# Patient Record
Sex: FEMALE | Race: WHITE | Employment: FULL TIME | URBAN - METROPOLITAN AREA
[De-identification: names, ages, dates, MRNs, and addresses within clinical notes are randomized per-mention and may not be internally consistent; named-entity substitution may affect disease eponyms.]

---

## 2018-03-12 ENCOUNTER — OFFICE VISIT (OUTPATIENT)
Dept: URGENT CARE | Facility: URGENT CARE | Age: 23
End: 2018-03-12
Payer: COMMERCIAL

## 2018-03-12 VITALS
OXYGEN SATURATION: 99 % | TEMPERATURE: 98.2 F | SYSTOLIC BLOOD PRESSURE: 122 MMHG | HEART RATE: 85 BPM | WEIGHT: 155 LBS | BODY MASS INDEX: 24.91 KG/M2 | DIASTOLIC BLOOD PRESSURE: 73 MMHG | HEIGHT: 66 IN

## 2018-03-12 DIAGNOSIS — J01.10 ACUTE NON-RECURRENT FRONTAL SINUSITIS: Primary | ICD-10-CM

## 2018-03-12 PROCEDURE — 99203 OFFICE O/P NEW LOW 30 MIN: CPT | Performed by: PHYSICIAN ASSISTANT

## 2018-03-12 NOTE — PROGRESS NOTES
"SUBJECTIVE:   Sheila Dangelo is a 22 year old female presenting with a chief complaint of sinus pain.     Onset of symptoms was 2 week(s) ago.  Course of illness is worsening.    Severity moderate  Current and Associated symptoms: sore throat, facial pain/pressure and headache. She has purulent nasal drainage. She has an AM cough- sometimes productive. No fever. No shortness of breath. She admits to fatigue.  Treatment measures tried include Decongestants with some improvement.  Predisposing factors include ill contact: School- .    ROS  See HPI    Past Medical History:   Diagnosis Date     Brachial cleft cyst      Hashimoto's thyroiditis      Thyroid disorder     hypo     Current Outpatient Prescriptions   Medication Sig Dispense Refill     amoxicillin-clavulanate (AUGMENTIN) 875-125 MG per tablet Take 1 tablet by mouth 2 times daily 20 tablet 0     levothyroxine (SYNTHROID, LEVOTHROID) 75 MCG tablet Take 0.5 tablets (37.5 mcg) by mouth daily 15 tablet 0       Family History   Problem Relation Age of Onset     Thyroid Disease Mother      Thyroid Disease Maternal Grandmother      Thyroid Disease Paternal Grandmother        Social History   Substance Use Topics     Smoking status: Never Smoker     Smokeless tobacco: Never Used     Alcohol use No     Smoking: none      OBJECTIVE  /73  Pulse 85  Temp 98.2  F (36.8  C)  Ht 5' 6\" (1.676 m)  Wt 155 lb (70.3 kg)  SpO2 99%  BMI 25.02 kg/m2    General: alert, appears well, NAD. Afebrile.  Skin: no suspicious lesions or rashes.  HEENT: Normocephalic.   Eyes: conjunctiva clear.   Ears: TMs pearly, translucent bilaterally.   Nose: no nasal polyps. + erythema and edema of nasal mucosa. No rhinorrhea. + frontal sinus tenderness.  Oropharynx: MMM. No posterior pharyngeal erythema, petechiae, or exudate. No tonsillar hypertrophy. Uvula midline.    Neck: supple, no lymphadenopathy.  Respiratory: No distress. Equal inspiration to bilateral bases. No crackles " wheeze, rhonchi, rales.   Cardiovascular: RRR. No murmurs, clicks, gallups, or rub.         ASSESSMENT/PLAN:    ICD-10-CM    1. Acute non-recurrent frontal sinusitis J01.10 amoxicillin-clavulanate (AUGMENTIN) 875-125 MG per tablet           Medical Decision Making:    Differential Diagnosis: viral URI, sinusitis    Serious Comorbid Conditions: none    Symptoms are suggestive of a bacterial sinusitis given persistence of sinus pressure and congestion, despite resolution of other URI symptoms.  No evidence for sinus abscess, CNS infection or mass, or any other more malicious etiology of symptoms today.  We will treat with Augmentin.  Also discussed and prescribed other supportive cares.      At the end of the encounter, I discussed all available results, as well as the diagnosis and any associated medications. I discussed red flags for immediate return to clinic/ER, as well as indications for follow up. Refer to patient instructions below, which were all addressed with patient. Patient understood and agreed to plan. Patient was appropriate for discharge.      Patient Instructions   Sinusitis  You have a sinus infection.    Take Augmentin twice daily with food x 10 days.    Take a probiotic while taking the antibiotic.    Take Mucinex twice daily. Use sinus rinses or Neti-pot, steamy showers, humidifiers.    Try Flonase 2 sprays in each nostril nightly at bedtime.    Take ibuprofen or Tylenol for sinus pain.    Follow up with primary care provider if no improvement in 2 weeks, sooner if worsening or developing any new symptoms.                  Sharmin Segura PA-C

## 2018-03-12 NOTE — NURSING NOTE
"Chief Complaint   Patient presents with     Facial Pain     pt in clinic c/o facial pain, congestion, ,and sore throat.      Urgent Care       Initial /73  Pulse 85  Temp 98.2  F (36.8  C)  Ht 5' 6\" (1.676 m)  Wt 155 lb (70.3 kg)  SpO2 99%  BMI 25.02 kg/m2 Estimated body mass index is 25.02 kg/(m^2) as calculated from the following:    Height as of this encounter: 5' 6\" (1.676 m).    Weight as of this encounter: 155 lb (70.3 kg).  Medication Reconciliation: complete   Bambi White/ MA    "

## 2018-03-12 NOTE — MR AVS SNAPSHOT
After Visit Summary   3/12/2018    Sheila Dangelo    MRN: [id number]           Patient Information     Date Of Birth          1995        Visit Information        Provider Department      3/12/2018 5:00 PM Sharmin Segura PA-C [facility]        Today's Diagnoses     Acute non-recurrent frontal sinusitis    -  1      Care Instructions    Sinusitis  You have a sinus infection.    Take Augmentin twice daily with food x 10 days.    Take a probiotic while taking the antibiotic.    Take Mucinex twice daily. Use sinus rinses or Neti-pot, steamy showers, humidifiers.    Try Flonase 2 sprays in each nostril nightly at bedtime.    Take ibuprofen or Tylenol for sinus pain.    Follow up with primary care provider if no improvement in 2 weeks, sooner if worsening or developing any new symptoms.            Follow-ups after your visit        Follow-up notes from your care team     Return if symptoms worsen or fail to improve.      Who to contact     If you have questions or need follow up information about today's clinic visit or your schedule please contact [facility] directly at [phone].  Normal or non-critical lab and imaging results will be communicated to you by TrialScopehart, letter or phone within 4 business days after the clinic has received the results. If you do not hear from us within 7 days, please contact the clinic through B2M Solutionst or phone. If you have a critical or abnormal lab result, we will notify you by phone as soon as possible.  Submit refill requests through FSLogix or call your pharmacy and they will forward the refill request to us. Please allow 3 business days for your refill to be completed.          Additional Information About Your Visit        TrialScopehart Information     FSLogix lets you send messages to your doctor, view your test results, renew your prescriptions, schedule appointments and more. To sign up, go to  [email or web address] . Click on \"Log in\" on the left side of the screen, which will take you to the Welcome page. Then click on \"Sign up Now\" on the right side of the page.     You will be asked to enter the access code listed below, as well as some personal information. Please follow the directions to create your username and password.     Your access code is: [email or web address]  Expires: 6/10/2018  5:06 PM     Your access code will  in 90 days. If you need help or a new code, please call your [place] clinic or [phone].        Care EveryWhere ID     This is your Care EveryWhere ID. This could be used by other organizations to access your [place] medical records  [id number]         Blood Pressure from Last 3 Encounters:   01/31/15 114/70   14 97/50   14 117/77    Weight from Last 3 Encounters:   01/31/15 152 lb (68.9 kg) (84 %)*   14 157 lb (71.2 kg) (88 %)*   14 152 lb 8 oz (69.2 kg) (86 %)*     * Growth percentiles are based on [facility] 2-20 Years data.              Today, you had the following     No orders found for display         Today's Medication Changes          These changes are accurate as of 3/12/18  5:06 PM.  If you have any questions, ask your nurse or doctor.               Start taking these medicines.        Dose/Directions    amoxicillin-clavulanate 875-125 MG per tablet   Commonly known as:  AUGMENTIN   Used for:  Acute non-recurrent frontal sinusitis        Dose:  1 tablet   Take 1 tablet by mouth 2 times daily   Quantity:  20 tablet   Refills:  0            Where to get your medicines      These medications were sent to [id number] - [place], [place] - [address] AT [facility] [address], [place] [address]    Hours:  24-hours Phone:  [phone]     amoxicillin-clavulanate 875-125 MG per tablet                Primary Care Provider Office Phone # Fax #    Cj Lacey [phone]       [facility] [address] [address]        Equal Access to Services     LUISAANAYA ARDEN : Hadii urbano pederson Soricky, waaxda lucarolinaadaha, qaybta kaallyda edy, aixa mccormackalinemarleen dennison. So [facility] [phone].    ATENCIÓN: Si habla español, tiene a pace disposición servicios gratuitos de asistencia lingüística. Kgame al [phone].    We comply with applicable federal civil rights laws and [place] laws. We do not discriminate on the basis of race, color, national origin, age, disability, sex, sexual orientation, or gender identity.            Thank you!     Thank you for choosing [facility]  for your care. Our goal is always to provide you with excellent care. Hearing back from our patients is one way we can continue to improve our services. Please take a few minutes to complete the written survey that you may receive in the mail after your visit with us. Thank you!             Your Updated Medication List - Protect others around you: Learn how to safely use, store and throw away your medicines at www.disposemymeds.org.          This list is accurate as of 3/12/18  5:06 PM.  Always use your most recent med list.                   Brand Name Dispense Instructions for use Diagnosis    amoxicillin-clavulanate 875-125 MG per tablet    AUGMENTIN    20 tablet    Take 1 tablet by mouth 2 times daily    Acute non-recurrent frontal sinusitis       levothyroxine 75 MCG tablet    SYNTHROID/LEVOTHROID    15 tablet    Take 0.5 tablets (37.5 mcg) by mouth daily    Goiter

## 2018-03-12 NOTE — PATIENT INSTRUCTIONS
Sinusitis  You have a sinus infection.    Take Augmentin twice daily with food x 10 days.    Take a probiotic while taking the antibiotic.    Take Mucinex twice daily. Use sinus rinses or Neti-pot, steamy showers, humidifiers.    Try Flonase 2 sprays in each nostril nightly at bedtime.    Take ibuprofen or Tylenol for sinus pain.    Follow up with primary care provider if no improvement in 2 weeks, sooner if worsening or developing any new symptoms.

## 2018-04-23 ENCOUNTER — OFFICE VISIT (OUTPATIENT)
Dept: URGENT CARE | Facility: URGENT CARE | Age: 23
End: 2018-04-23
Payer: COMMERCIAL

## 2018-04-23 VITALS
SYSTOLIC BLOOD PRESSURE: 90 MMHG | BODY MASS INDEX: 23.3 KG/M2 | RESPIRATION RATE: 14 BRPM | TEMPERATURE: 97.8 F | DIASTOLIC BLOOD PRESSURE: 60 MMHG | HEIGHT: 66 IN | HEART RATE: 90 BPM | WEIGHT: 145 LBS

## 2018-04-23 DIAGNOSIS — H60.502 ACUTE NONINFECTIVE OTITIS EXTERNA OF LEFT EAR, UNSPECIFIED TYPE: Primary | ICD-10-CM

## 2018-04-23 PROCEDURE — 99214 OFFICE O/P EST MOD 30 MIN: CPT | Performed by: FAMILY MEDICINE

## 2018-04-23 RX ORDER — NEOMYCIN SULFATE, POLYMYXIN B SULFATE AND HYDROCORTISONE 10; 3.5; 1 MG/ML; MG/ML; [USP'U]/ML
4 SUSPENSION/ DROPS AURICULAR (OTIC) 3 TIMES DAILY
Qty: 6 ML | Refills: 0 | Status: SHIPPED | OUTPATIENT
Start: 2018-04-23 | End: 2018-05-03

## 2018-04-23 NOTE — MR AVS SNAPSHOT
After Visit Summary   4/23/2018    Sheila Dangelo    MRN: [id number]           Patient Information     Date Of Birth          1995        Visit Information        Provider Department      4/23/2018 6:35 PM Ramonita Iverson MD [facility]        Today's Diagnoses     Acute noninfective otitis externa of left ear, unspecified type    -  1      Care Instructions      External Ear Infection (Adult)    External otitis (also called  swimmer s ear ) is an infection in the ear canal. It is often caused by bacteria or fungus. It can occur a few days after water gets trapped in the ear canal (from swimming or bathing). It can also occur after cleaning too deeply in the ear canal with a cotton swab or other object. Sometimes, hair care products get into the ear canal and cause this problem.  Symptoms can include pain, fever, itching, redness, drainage, or swelling of the ear canal. Temporary hearing loss may also occur.  Home care    Do not try to clean the ear canal. This can push pus and bacteria deeper into the canal.    Use prescribed ear drops as directed. These help reduce swelling and fight the infection. If an ear wick was placed in the ear canal, apply drops right onto the end of the wick. The wick will draw the medicine into the ear canal even if it is swollen closed.    A cotton ball may be loosely placed in the outer ear to absorb any drainage.    You may use acetaminophen or ibuprofen to control pain, unless another medicine was prescribed. Note: If you have chronic liver or kidney disease or ever had a stomach ulcer or GI bleeding, talk to your healthcare provider before taking any of these medicines.    Do not allow water to get into your ear when bathing. Also, don't swim until the infection has cleared.  Prevention    Keep your ears dry. This helps lower the risk of infection. Dry your ears with a towel or hair dryer after getting wet. Also, use ear plugs when  swimming.    Do not stick any objects in the ear to remove wax.    If you feel water trapped in your ear, use ear drops right away. You can get these drops over the counter at most drugstores. They work by removing water from the ear canal.  Follow-up care  Follow up with your healthcare provider in 1 week, or as advised.  When to seek medical advice  Call your healthcare provider right away if any of these occur:    Ear pain becomes worse or doesn t improve after 3 days of treatment    Redness or swelling of the outer ear occurs or gets worse    Headache    Painful or stiff neck    Drowsiness or confusion    Fever of 100.4 F (38 C) or higher, or as directed by your healthcare provider    Seizure  Date Last Reviewed: 10/1/2017    8175-5774 [employer]. [address] [place] [address]. All rights reserved. This information is not intended as a substitute for professional medical care. Always follow your healthcare professional's instructions.                Follow-ups after your visit        Follow-up notes from your care team     Return if symptoms worsen or fail to improve.      Who to contact     If you have questions or need follow up information about today's clinic visit or your schedule please contact [facility] directly at [phone].  Normal or non-critical lab and imaging results will be communicated to you by Videologyhart, letter or phone within 4 business days after the clinic has received the results. If you do not hear from us within 7 days, please contact the clinic through Videologyhart or phone. If you have a critical or abnormal lab result, we will notify you by phone as soon as possible.  Submit refill requests through TV Volume Wizard App or call your pharmacy and they will forward the refill request to us. Please allow 3 business days for your refill to be completed.          Additional Information About Your Visit        TV Volume Wizard App Information     TV Volume Wizard App lets you send  "messages to your doctor, view your test results, renew your prescriptions, schedule appointments and more. To sign up, go to [email or web address] . Click on \"Log in\" on the left side of the screen, which will take you to the Welcome page. Then click on \"Sign up Now\" on the right side of the page.     You will be asked to enter the access code listed below, as well as some personal information. Please follow the directions to create your username and password.     Your access code is: [email or web address]  Expires: 6/10/2018  5:06 PM     Your access code will  in 90 days. If you need help or a new code, please call your [place] clinic or [phone].        Care EveryWhere ID     This is your Care EveryWhere ID. This could be used by other organizations to access your [place] medical records  [id number]        Your Vitals Were     Pulse Temperature Respirations Height Last Period Breastfeeding?    90 97.8  F (36.6  C) (Oral) 14 5' 6\" (1.676 m) 2018 Unknown    BMI (Body Mass Index)                   23.4 kg/m2            Blood Pressure from Last 3 Encounters:   18 90/60   18 122/73   01/31/15 114/70    Weight from Last 3 Encounters:   18 145 lb (65.8 kg)   18 155 lb (70.3 kg)   01/31/15 152 lb (68.9 kg) (84 %)*     * Growth percentiles are based on CDC 2-20 Years data.              Today, you had the following     No orders found for display         Today's Medication Changes          These changes are accurate as of 18  7:15 PM.  If you have any questions, ask your nurse or doctor.               Start taking these medicines.        Dose/Directions    neomycin-polymyxin-hydrocortisone 3.5-39540-8 otic suspension   Commonly known as:  CORTISPORIN   Used for:  Acute noninfective otitis externa of left ear, unspecified type   Started by:  Ramonita Iverson MD        Dose:  4 drop   Place 4 drops Into the left ear 3 times daily for 10 days   Quantity:  6 mL   Refills:  " 0         Stop taking these medicines if you haven't already. Please contact your care team if you have questions.     amoxicillin-clavulanate 875-125 MG per tablet   Commonly known as:  AUGMENTIN   Stopped by:  Ramonita Iverson MD                Where to get your medicines      These medications were sent to [id number] - [place], [place] - [address] AT [facility] [address]  [address], [place] [address]    Hours:  24-hours Phone:  [phone]     neomycin-polymyxin-hydrocortisone 3.5-63367-8 otic suspension                Primary Care Provider Office Phone # Fax #    Cj Lacey [phone]       [facility] [address]  [facility] [address]        Equal Access to Services     ANAYA [facility] HospitalPHILLIP : Hadii aad ku hadasho Soomaali, waaxda luqadaha, qaybta kaalmada adeegyada, aixa juares hayaan moni jean . So [facility] [phone].    ATENCIÓN: Si habla español, tiene a pace disposición servicios gratuitos de asistencia lingüística. Evi al [phone].    We comply with applicable federal civil rights laws and [place] laws. We do not discriminate on the basis of race, color, national origin, age, disability, sex, sexual orientation, or gender identity.            Thank you!     Thank you for choosing [facility]  for your care. Our goal is always to provide you with excellent care. Hearing back from our patients is one way we can continue to improve our services. Please take a few minutes to complete the written survey that you may receive in the mail after your visit with us. Thank you!             Your Updated Medication List - Protect others around you: Learn how to safely use, store and throw away your medicines at www.disposemymeds.org.          This list is accurate as of 4/23/18  7:15 PM.  Always use your most recent med list.                   Brand Name Dispense Instructions for use  Diagnosis    levothyroxine 75 MCG tablet    SYNTHROID/LEVOTHROID    15 tablet    Take 0.5 tablets (37.5 mcg) by mouth daily    Goiter       neomycin-polymyxin-hydrocortisone 3.5-82250-1 otic suspension    CORTISPORIN    6 mL    Place 4 drops Into the left ear 3 times daily for 10 days    Acute noninfective otitis externa of left ear, unspecified type

## 2018-04-24 NOTE — PATIENT INSTRUCTIONS
External Ear Infection (Adult)    External otitis (also called  swimmer s ear ) is an infection in the ear canal. It is often caused by bacteria or fungus. It can occur a few days after water gets trapped in the ear canal (from swimming or bathing). It can also occur after cleaning too deeply in the ear canal with a cotton swab or other object. Sometimes, hair care products get into the ear canal and cause this problem.  Symptoms can include pain, fever, itching, redness, drainage, or swelling of the ear canal. Temporary hearing loss may also occur.  Home care    Do not try to clean the ear canal. This can push pus and bacteria deeper into the canal.    Use prescribed ear drops as directed. These help reduce swelling and fight the infection. If an ear wick was placed in the ear canal, apply drops right onto the end of the wick. The wick will draw the medicine into the ear canal even if it is swollen closed.    A cotton ball may be loosely placed in the outer ear to absorb any drainage.    You may use acetaminophen or ibuprofen to control pain, unless another medicine was prescribed. Note: If you have chronic liver or kidney disease or ever had a stomach ulcer or GI bleeding, talk to your healthcare provider before taking any of these medicines.    Do not allow water to get into your ear when bathing. Also, don't swim until the infection has cleared.  Prevention    Keep your ears dry. This helps lower the risk of infection. Dry your ears with a towel or hair dryer after getting wet. Also, use ear plugs when swimming.    Do not stick any objects in the ear to remove wax.    If you feel water trapped in your ear, use ear drops right away. You can get these drops over the counter at most drugstores. They work by removing water from the ear canal.  Follow-up care  Follow up with your healthcare provider in 1 week, or as advised.  When to seek medical advice  Call your healthcare provider right away if any of these  occur:    Ear pain becomes worse or doesn t improve after 3 days of treatment    Redness or swelling of the outer ear occurs or gets worse    Headache    Painful or stiff neck    Drowsiness or confusion    Fever of 100.4 F (38 C) or higher, or as directed by your healthcare provider    Seizure  Date Last Reviewed: 10/1/2017    4219-8373 [employer]. [address] [place] [address]. All rights reserved. This information is not intended as a substitute for professional medical care. Always follow your healthcare professional's instructions.

## 2018-04-24 NOTE — PROGRESS NOTES
"(S) 23 year old female complains of pain in left ear for 1 week.  She noticed some blood in the left ear canal.  It feels sore and plugged up.  No fever or URI symptoms. Has not been swimming.  She has a h/o left ruptured TM last fall.     (O) BP 90/60  Pulse 90  Temp 97.8  F (36.6  C) (Oral)  Resp 14  Ht 5' 6\" (1.676 m)  Wt 145 lb (65.8 kg)  LMP 04/23/2018  Breastfeeding? Unknown  BMI 23.4 kg/m2    Left ear reveals tenderness of the tragus; old blood scab at inferior aspect of left external canal. TM is normal.  Right ear canal and TM are normal.     (A) Left Otitis Externa    (P)  Suspect early infection from previous ear canal trauma.  RTC if not improving as anticipated.     Ramonita Kimble MD   "

## 2018-07-11 ENCOUNTER — RECORDS - HEALTHEAST (OUTPATIENT)
Dept: LAB | Facility: CLINIC | Age: 23
End: 2018-07-11

## 2018-07-11 LAB
ALBUMIN UR-MCNC: ABNORMAL MG/DL
APPEARANCE UR: ABNORMAL
BACTERIA #/AREA URNS HPF: ABNORMAL HPF
BILIRUB UR QL STRIP: NEGATIVE
COLOR UR AUTO: YELLOW
GLUCOSE UR STRIP-MCNC: NEGATIVE MG/DL
HGB UR QL STRIP: ABNORMAL
KETONES UR STRIP-MCNC: NEGATIVE MG/DL
LEUKOCYTE ESTERASE UR QL STRIP: ABNORMAL
MUCOUS THREADS #/AREA URNS LPF: ABNORMAL LPF
NITRATE UR QL: NEGATIVE
PH UR STRIP: 7 [PH] (ref 4.5–8)
RBC #/AREA URNS AUTO: ABNORMAL HPF
SP GR UR STRIP: 1.02 (ref 1–1.03)
SQUAMOUS #/AREA URNS AUTO: >100 LPF
UROBILINOGEN UR STRIP-ACNC: ABNORMAL
WBC #/AREA URNS AUTO: ABNORMAL HPF
WBC CLUMPS #/AREA URNS HPF: PRESENT /[HPF]

## 2018-07-12 LAB — BACTERIA SPEC CULT: NO GROWTH

## 2019-01-26 ENCOUNTER — OFFICE VISIT (OUTPATIENT)
Dept: URGENT CARE | Facility: URGENT CARE | Age: 24
End: 2019-01-26
Payer: COMMERCIAL

## 2019-01-26 VITALS
DIASTOLIC BLOOD PRESSURE: 72 MMHG | TEMPERATURE: 98.1 F | SYSTOLIC BLOOD PRESSURE: 107 MMHG | HEART RATE: 67 BPM | BODY MASS INDEX: 23.3 KG/M2 | WEIGHT: 145 LBS | HEIGHT: 66 IN | OXYGEN SATURATION: 100 %

## 2019-01-26 DIAGNOSIS — L03.012 PARONYCHIA OF FINGER OF LEFT HAND: Primary | ICD-10-CM

## 2019-01-26 PROCEDURE — 99213 OFFICE O/P EST LOW 20 MIN: CPT | Performed by: FAMILY MEDICINE

## 2019-01-26 ASSESSMENT — MIFFLIN-ST. JEOR: SCORE: 1434.47

## 2019-01-26 NOTE — PROGRESS NOTES
Subjective: Patient has had trouble with nails in the past with biting, worse and thick nails for a while but took them off, 2 days ago she started noticing left middle finger pain and swelling got a lot worse today.  No pus coming out.    Objective: He has redness and swelling around the nail left middle finger.  I do not feel a pocket of pus at this point.    Assessment and plan: Paronychial infection, will try antibiotics but if it gets worse and will need to be opened up.  She should know by tomorrow.

## 2019-02-23 ENCOUNTER — OFFICE VISIT (OUTPATIENT)
Dept: URGENT CARE | Facility: URGENT CARE | Age: 24
End: 2019-02-23
Payer: COMMERCIAL

## 2019-02-23 VITALS
HEART RATE: 66 BPM | WEIGHT: 145 LBS | OXYGEN SATURATION: 100 % | SYSTOLIC BLOOD PRESSURE: 91 MMHG | TEMPERATURE: 97.1 F | BODY MASS INDEX: 23.4 KG/M2 | DIASTOLIC BLOOD PRESSURE: 64 MMHG

## 2019-02-23 DIAGNOSIS — L03.012 PARONYCHIA OF LEFT MIDDLE FINGER: Primary | ICD-10-CM

## 2019-02-23 PROCEDURE — 99213 OFFICE O/P EST LOW 20 MIN: CPT | Performed by: FAMILY MEDICINE

## 2019-02-23 RX ORDER — CEPHALEXIN 500 MG/1
500 CAPSULE ORAL 4 TIMES DAILY
Qty: 28 CAPSULE | Refills: 0 | Status: SHIPPED | OUTPATIENT
Start: 2019-02-23 | End: 2019-03-02

## 2019-02-23 NOTE — PATIENT INSTRUCTIONS
Start the antibiotic today.  Warm soaks or compresses.   Ibuprofen or tylenol for discomfort.    Anticipate starting to improve after ~ 48 hours on the antibiotic, resolving by end of the course of medication.      Patient Education     Paronychia of the Finger or Toe  Paronychia is an infection near a fingernail or toenail. It usually occurs when an opening in the cuticle or an ingrown toenail lets bacteria under the skin.  The infection will need to be drained if pus is present. If the infection has been caught early, you may need only antibiotic treatment. Healing will take about 1 to 2 weeks.  Home care  Follow these guidelines when caring for yourself at home:    Clean and soak the toe or finger. Do this 2 times a day for the first 3 days. To do so:  ? Soak your foot or hand in a tub of warm water for 5 minutes. Or hold your toe or finger under a faucet of warm running water for 5 minutes.  ? Clean any crust away with soap and water using a cotton swab.  ? Put antibiotic ointment on the infected area.    Change the dressing daily or any time it gets dirty.    If you were given antibiotics, take them as directed until they are all gone.    If your infection is on a toe, wear comfortable shoes with a lot of toe room. You can also wear open-toed sandals while your toe heals.    You may use over-the-counter medicine (acetaminophen or ibuprofen to help with pain, unless another medicine was prescribed. If you have chronic liver or kidney disease, talk with your healthcare provider before using these medicines. Also talk with your provider if you've had a stomach ulcer or GI (gastrointestinal) bleeding.  Prevention  The following can prevent paronychia:    Avoid cutting or playing with your cuticles at home.    Don't bite your nails.    Don't suck on your thumbs or fingers.  Follow-up care  Follow up with your healthcare provider, or as advised.  When to seek medical advice  Call your healthcare provider right away  if any of these occur:    Redness, pain, or swelling of the finger or toe gets worse    Red streaks in the skin leading away from the wound    Pus or fluid draining from the nail area    Fever of 100.4 F (38 C) or higher, or as directed by your provider  Date Last Reviewed: 8/1/2016 2000-2018 [employer]. [address] [place] [address]. All rights reserved. This information is not intended as a substitute for professional medical care. Always follow your healthcare professional's instructions.

## 2019-02-23 NOTE — PROGRESS NOTES
SUBJECTIVE:  Sheila Dangelo is a 23 year old female who presents complaining of left third finger pain.  She has noted some redness and swelling along the cuticle margin.  Symptoms began this week. Had some purulent green drainage at one point, nothing today.  Severity: mild.  She denies any trauma to the area.  No fevers or chills noted.  No migration of redness or swelling proximally.    Past Medical History:   Diagnosis Date     Brachial cleft cyst      Hashimoto's thyroiditis      Thyroid disorder     hypo     Current Outpatient Medications   Medication Sig Dispense Refill     cephALEXin (KEFLEX) 500 MG capsule Take 1 capsule (500 mg) by mouth 4 times daily for 7 days 28 capsule 0     levothyroxine (SYNTHROID, LEVOTHROID) 75 MCG tablet Take 0.5 tablets (37.5 mcg) by mouth daily 15 tablet 0     Social History     Tobacco Use     Smoking status: Never Smoker     Smokeless tobacco: Never Used   Substance Use Topics     Alcohol use: No       ROS:  ROS negative except as stated above    OBJECTIVE:  BP 91/64   Pulse 66   Temp 97.1  F (36.2  C) (Tympanic)   Wt 65.8 kg (145 lb)   LMP 02/23/2019   SpO2 100%   BMI 23.40 kg/m    Hand exam:  examination of third finger reveals paronychia with redness, tenderness and minimal swelling along distal finger.  No fluctuance, no drainage.     ASSESSMENT/PLAN:    ICD-10-CM    1. Paronychia of left middle finger L03.012 cephALEXin (KEFLEX) 500 MG capsule     No swelling/fluctuance to I&D today.  Will go with soaks and abx.  We discussed the expected course of the infection and symptomatic cares in detail.   Advised to return to care if symptoms not improving as expected, do not resolve completely, or if any new or worsening symptoms develop.

## 2021-11-16 ENCOUNTER — LAB REQUISITION (OUTPATIENT)
Dept: LAB | Facility: CLINIC | Age: 26
End: 2021-11-16

## 2021-11-16 PROCEDURE — U0003 INFECTIOUS AGENT DETECTION BY NUCLEIC ACID (DNA OR RNA); SEVERE ACUTE RESPIRATORY SYNDROME CORONAVIRUS 2 (SARS-COV-2) (CORONAVIRUS DISEASE [COVID-19]), AMPLIFIED PROBE TECHNIQUE, MAKING USE OF HIGH THROUGHPUT TECHNOLOGIES AS DESCRIBED BY CMS-2020-01-R: HCPCS | Performed by: NURSE PRACTITIONER

## 2021-11-17 LAB
SARS-COV-2 RNA RESP QL NAA+PROBE: NORMAL
SARS-COV-2 RNA RESP QL NAA+PROBE: NOT DETECTED